# Patient Record
Sex: FEMALE | Race: BLACK OR AFRICAN AMERICAN | URBAN - METROPOLITAN AREA
[De-identification: names, ages, dates, MRNs, and addresses within clinical notes are randomized per-mention and may not be internally consistent; named-entity substitution may affect disease eponyms.]

---

## 2017-02-16 NOTE — PATIENT DISCUSSION
POAG, OU: INTRAOCULAR PRESSURE IS WITHIN ACCEPTABLE LIMITS OU. CONTINUE LATANOPROST HS OS, ALPHAGAN BID OS AND LEVOBUNOLOL BID OU. TO MANAGE INTRAOCULAR PRESSURE. RETURN FOR FOLLOW-UP AS SCHEDULED.

## 2017-02-16 NOTE — PATIENT DISCUSSION
Continue: erythromycin (erythromycin): ointment: 5 mg/gram (0.5 %) a small amount at bedtime as directed on eyelid

## 2017-08-24 NOTE — PATIENT DISCUSSION
POAG, OU: INTRAOCULAR PRESSURE IS WITHIN ACCEPTABLE LIMITS OU. CONTINUE  TO MANAGE INTRAOCULAR PRESSURE. RETURN FOR FOLLOW-UP AS SCHEDULED.

## 2018-02-22 NOTE — PATIENT DISCUSSION
POAG, OU: INTRAOCULAR PRESSURE IS WITHIN ACCEPTABLE LIMITS. PT INSTRUCTED TO CONTINUE ALPHAGAN, LATANOPROST &amp; LEVOBUNOLOL AND RETURN FOR FOLLOW-UP AS SCHEDULED.

## 2018-12-06 NOTE — PATIENT DISCUSSION
POSTERIOR CAPSULAR FIBROSIS, OU:  VISUALLY SIGNIFICANT. OPTION OF YAG LASER VERSUS UPDATING GLASSES VERSUS FOLLOWING DISCUSSED. RBA'S DISCUSSED, PATIENT UNDERSTANDS AND DESIRES YAG LASER TO INCREASE VISION FOR WATCHING TV AND DRIVING. SCHEDULE YAG LASER OS.

## 2018-12-06 NOTE — PATIENT DISCUSSION
POAG, OU: INTRAOCULAR PRESSURE MAY NOT BE LOW ENOUGH. DISCUSSED WITH PT NEED FOR CONSULTATION WITH DR. PINEDA FOR POTENTIAL WORSENING GLAUCOMA. WILL SCHEDULE CONSULT WITH DR. PINEDA SAME DAY AS VF. PT INSTRUCTED TO CONTINUE ALPHAGAN BID OS, LATANOPROST QHS OS &amp; LEVOBUNOLOL BID OU AND RETURN FOR FOLLOW-UP AS SCHEDULED.

## 2019-01-17 NOTE — PATIENT DISCUSSION
S/P KDB OS:   ACCEPTABLE IOP TODAY. CONTINUE DROPS AND FOLLOW UP AS SCHEDULED. PATIENT IS USING TIMOLOL, ALPHAGAN AND LATANOPROST. PT STOPPED DORZOLAMIDE AND RHOPRESSA AT THE SAME TIME SO UNSURE WHICH DROP WAS CAUSING THE NAUSEA.

## 2019-04-25 NOTE — PATIENT DISCUSSION
ordered to do not removed patient's jaimes.    Continue: levobunolol (levobunolol): drops: 0.5% 01-

## 2019-04-25 NOTE — PATIENT DISCUSSION
POAG, OU: INTRAOCULAR PRESSURE IS WITHIN ACCEPTABLE LIMITS. PT INSTRUCTED TO CONTINUE ALPHAGAN OU BID, LATANOPROST OU QHS, RHOPRESSA OU QHS, LEVOBUNOLOL OU BID. PRESSURE VERY LOW IN THE LEFT EYE CAN STOP RHOPRESSA IN THE LEFT EYE BUT CONTINUE IN THE RIGHT EYE  AND RETURN FOR FOLLOW-UP AS SCHEDULED.

## 2019-05-29 NOTE — PATIENT DISCUSSION
"""Follow OS ERM w/o surgery. Call if vision decreases or distortion increases. Recommend regular Amsler checks.  """ from pre op

## 2019-09-19 NOTE — PATIENT DISCUSSION
COAG  OU- IOP SLIGHTLY ELEVATED. WILL SEE IF VISUAL FIELD SHOWS ANY PROGRESSION OR IF PRESSURES GO BACK DOWN BEFORE CHANGING TREATMENT.   PT INSTRUCTED TO CONTINUE ALPHAGAN OU BID, LATANOPROST OU QHS, RHOPRESSA OU QHS, LEVOBUNOLOL OU BID

## 2019-12-12 NOTE — PATIENT DISCUSSION
COAG OU  OS&gt;&gt; OD. DISCUSSSED WORSENING OF VF OS. INFERIOR ARCUATE DEFECT. PATIENT IS S/P SLT AND KDB.

## 2019-12-12 NOTE — PATIENT DISCUSSION
CHANGE  TIMOLOL TO COSOPT OU BID AND CHANGE BRIMONIDINE TO TID.   CONTINUE RHOPRESSA OU QHS AND ALSO LATANOPROST OU QHS

## 2020-03-12 NOTE — PATIENT DISCUSSION
POAG OU:  CONTINUE COSOPT OU BID AND BRIMONIDINE OU TID,  RHOPRESSA OU QHS AND ALSO LATANOPROST OU QHS.  PATIENT COMPLAINS OF IRRITATION ON THE SKIN WHEN USING THE COSOPT POSSIBLY, WILL STOP FOR A FEW DAYS AND SEE IF THAT IS THE PROBLEM

## 2020-11-16 NOTE — PATIENT DISCUSSION
POAG OU:  VISUAL FIELD SHOWS AN INCREASE PROGRESSION OF VISION LOSS SHOWING INFERIOR NASAL STEP DEFECTS OD AND INFERIOR ARCUATE STEP DEFECTS OS, CURRENTLY ON BRIMONIDINE OU TID, LATANOPROST OU QHS AND RHOPRESSA OU QHS. WILL DISCUSS DROPS VS SLT.

## 2020-12-03 NOTE — PATIENT DISCUSSION
POAG OU: VISUAL FIELD SHOWED A SLIGHT INCREASE PROGRESSION OF VISION LOSS SHOWING INFERIOR NASAL STEP DEFECTS OD (ONLY GOOD EYE) AND INFERIOR ARCUATE STEP DEFECTS OS, CURRENTLY ON BRIMONIDINE OU TID, COSOPT OU BID,  LATANOPROST OU QHS AND RHOPRESSA OU QHS. OCCASIONALLY FORGETS THE THIRD ALPHAGAN DROP. DISCUSSED DROPS ADDING PILOCARPINE OU TID  VS KDB. PATIENT WISHES TO PROCEED WITH KDB OD.

## 2020-12-08 NOTE — PATIENT DISCUSSION
New Prescription: prednisolone acetate (prednisolone acetate): drops,suspension: 1% 1 drop four times a day into affected eye 12-

## 2021-03-04 NOTE — PATIENT DISCUSSION
Continue: dorzolamide-timolol (dorzolamide-timolol): drops: 2-0.5% 1 drop twice a day into both eyes 01-

## 2021-03-04 NOTE — PATIENT DISCUSSION
Stopped Today: prednisolone acetate (prednisolone acetate): drops,suspension: 1% 1 drop four times a day into affected eye 12-

## 2021-03-04 NOTE — PATIENT DISCUSSION
POAG, OU: INTRAOCULAR PRESSURE IS WITHIN ACCEPTABLE LIMITS. PT INSTRUCTED TO CONTINUE ALPHAGAN OU TID, DORZOLAMIDE-TIMOLOL OU BID, LATANOPROST AND RHOPRESSA OU QHS AND RETURN FOR FOLLOW-UP AS SCHEDULED.

## 2021-07-26 NOTE — PATIENT DISCUSSION
Routine dilated exam today despite abnormal findings. Eyeglass prescription given. Patient instructed to call office immediately if sudden changes in vision occur. Emphasized importance of sunglasses and healthy lifestyle.

## 2021-07-26 NOTE — PATIENT DISCUSSION
Patient requested a letter be sent to neurologist, Dr. Claritza Boone. She states he had her listed with a history of AMD. Patient also need notation if she was able to drive. Patient does not have a history of AMD and does meet driving criteria. Updated MRX was given today.

## 2021-11-18 NOTE — PATIENT DISCUSSION
Continue: dorzolamide-timolol (dorzolamide-timolol): drops: 2-0.5% 1 drop twice a day into both eyes 01-.

## 2022-05-20 NOTE — PATIENT DISCUSSION
The IOP is in the target range. Continue Latanoprost drop therapy. Follow up for 6 months for IOP check. Schedule HVF/RNFL OCT prior to next visit.

## 2022-05-20 NOTE — PATIENT DISCUSSION
The IOP is in the target range. Continue Latanoprost drop therapy. Follow up in 6 months for IOP check. Schedule HVF/RNFL OCT prior to next visit.

## 2022-08-23 ENCOUNTER — NEW PATIENT (OUTPATIENT)
Dept: URBAN - METROPOLITAN AREA CLINIC 11 | Facility: CLINIC | Age: 52
End: 2022-08-23

## 2022-08-23 DIAGNOSIS — H35.712: ICD-10-CM

## 2022-08-23 PROCEDURE — 99204 OFFICE O/P NEW MOD 45 MIN: CPT

## 2022-08-23 PROCEDURE — 92134 CPTRZ OPH DX IMG PST SGM RTA: CPT

## 2022-08-23 RX ORDER — BROMFENAC 0.9 MG/ML
1 SOLUTION/ DROPS OPHTHALMIC
Start: 2022-08-23

## 2022-08-23 ASSESSMENT — VISUAL ACUITY
OS_CC: 20/40+1
OD_CC: 20/20
OU_CC: 20/20

## 2022-08-23 ASSESSMENT — TONOMETRY
OS_IOP_MMHG: 14
OD_IOP_MMHG: 14

## 2022-10-13 ENCOUNTER — FOLLOW UP (OUTPATIENT)
Dept: URBAN - METROPOLITAN AREA CLINIC 11 | Facility: CLINIC | Age: 52
End: 2022-10-13

## 2022-10-13 DIAGNOSIS — H35.712: ICD-10-CM

## 2022-10-13 PROCEDURE — 99213 OFFICE O/P EST LOW 20 MIN: CPT

## 2022-10-13 PROCEDURE — 92134 CPTRZ OPH DX IMG PST SGM RTA: CPT

## 2022-10-13 ASSESSMENT — TONOMETRY
OD_IOP_MMHG: 19
OS_IOP_MMHG: 10

## 2022-10-13 ASSESSMENT — VISUAL ACUITY
OS_CC: 20/50+1
OD_CC: 20/20

## 2022-11-04 NOTE — PATIENT DISCUSSION
Patient only experiences double vision when watching TV [employer] [facility]). States she does not experience double vision when she is walking around. Advised patient that if she is not bothered by her double vision then she does not have to feel the glasses prescription.

## 2022-12-13 ENCOUNTER — FOLLOW UP (OUTPATIENT)
Dept: URBAN - METROPOLITAN AREA CLINIC 11 | Facility: CLINIC | Age: 52
End: 2022-12-13

## 2022-12-13 PROCEDURE — 99214 OFFICE O/P EST MOD 30 MIN: CPT

## 2022-12-13 PROCEDURE — 92134 CPTRZ OPH DX IMG PST SGM RTA: CPT

## 2022-12-13 ASSESSMENT — TONOMETRY
OD_IOP_MMHG: 12
OS_IOP_MMHG: 13

## 2022-12-13 ASSESSMENT — VISUAL ACUITY
OD_CC: 20/20
OS_CC: 20/40-2

## 2023-01-03 NOTE — PATIENT DISCUSSION
PT HAS BEEN OUT OF LATANOPROST AND HAS NOT BEEN USING COMBIGAN.  PT SHOULD BE ON LATANOPROST QHS OU, RHOPRESSA QHS OU, AND COMBIGAN BID OU.  SEND ALL DROPS TO PHARMACY AND GAVE PT WRITTEN HAND OUT.  PT SHOULD NOT BE USING ALPHAGAN BY ITSELF AS IT IS A COMPONENT OF COMBIGAN.

## 2023-04-10 ENCOUNTER — FOLLOW UP (OUTPATIENT)
Dept: URBAN - METROPOLITAN AREA CLINIC 18 | Facility: CLINIC | Age: 53
End: 2023-04-10

## 2023-04-10 DIAGNOSIS — H25.13: ICD-10-CM

## 2023-04-10 DIAGNOSIS — H35.712: ICD-10-CM

## 2023-04-10 PROCEDURE — 99214 OFFICE O/P EST MOD 30 MIN: CPT

## 2023-04-10 PROCEDURE — 92134 CPTRZ OPH DX IMG PST SGM RTA: CPT

## 2023-04-10 ASSESSMENT — TONOMETRY
OD_IOP_MMHG: 8
OS_IOP_MMHG: 9

## 2023-04-10 ASSESSMENT — VISUAL ACUITY
OS_CC: 20/25
OD_CC: 20/20